# Patient Record
Sex: MALE | URBAN - METROPOLITAN AREA
[De-identification: names, ages, dates, MRNs, and addresses within clinical notes are randomized per-mention and may not be internally consistent; named-entity substitution may affect disease eponyms.]

---

## 2020-08-19 ENCOUNTER — APPOINTMENT (RX ONLY)
Dept: URBAN - METROPOLITAN AREA CLINIC 86 | Facility: CLINIC | Age: 40
Setting detail: DERMATOLOGY
End: 2020-08-19

## 2020-08-19 DIAGNOSIS — L08.0 PYODERMA: ICD-10-CM

## 2020-08-19 PROCEDURE — ? COUNSELING

## 2020-08-19 PROCEDURE — ? TREATMENT REGIMEN

## 2020-08-19 PROCEDURE — 99202 OFFICE O/P NEW SF 15 MIN: CPT | Mod: 95

## 2020-08-19 PROCEDURE — ? CONSENT FOR TELEMEDICINE VISIT OBTAINED

## 2020-08-19 ASSESSMENT — LOCATION ZONE DERM: LOCATION ZONE: LEG

## 2020-08-19 ASSESSMENT — LOCATION SIMPLE DESCRIPTION DERM: LOCATION SIMPLE: LEFT CALF

## 2020-08-19 ASSESSMENT — LOCATION DETAILED DESCRIPTION DERM: LOCATION DETAILED: LEFT DISTAL CALF

## 2020-08-19 NOTE — HPI: SKIN LESION
Is This A New Presentation, Or A Follow-Up?: Skin Lesion
What Type Of Note Output Would You Prefer (Optional)?: Standard Output
How Severe Is Your Skin Lesion?: moderate
Has Your Skin Lesion Been Treated?: not been treated
Additional History: Patient has been using mupirocin twice a day.

## 2020-08-19 NOTE — PROCEDURE: TREATMENT REGIMEN
Initiate Treatment: - levicyn gel spray apply to affected areas twice a day
Detail Level: Zone
Continue Regimen: - benzoyl peroxide wash axillae, fingers, legs lather for 1 min and then rinse with lukewarm water \\n\\n- mupirocin ointment to affected areas twice a day for 14 days
Plan: Recommended seeing Dr. Margaret Glez